# Patient Record
Sex: MALE | Race: WHITE | NOT HISPANIC OR LATINO | Employment: OTHER | URBAN - METROPOLITAN AREA
[De-identification: names, ages, dates, MRNs, and addresses within clinical notes are randomized per-mention and may not be internally consistent; named-entity substitution may affect disease eponyms.]

---

## 2023-08-14 ENCOUNTER — MEDICAL CORRESPONDENCE (OUTPATIENT)
Dept: HEALTH INFORMATION MANAGEMENT | Facility: CLINIC | Age: 63
End: 2023-08-14

## 2023-08-15 ENCOUNTER — TRANSCRIBE ORDERS (OUTPATIENT)
Dept: OTHER | Age: 63
End: 2023-08-15

## 2023-08-15 DIAGNOSIS — H53.9 VISUAL DISTURBANCE: ICD-10-CM

## 2023-08-15 DIAGNOSIS — H57.12 DISCOMFORT OF LEFT EYE: Primary | ICD-10-CM

## 2023-08-24 NOTE — TELEPHONE ENCOUNTER
FUTURE VISIT INFORMATION      FUTURE VISIT INFORMATION:  Date: 10/4/23  Time: 7:15am  Location: [facility]  REFERRAL INFORMATION:  Referring provider:  Dr Tamara Watkins   Referring providers clinic:  [facility]  Reason for visit/diagnosis  Discomfort of left eye [H57.12], Visual disturbance [H53.9],neuro-ophth-discomfort of left eye, visual disturbance    RECORDS REQUESTED FROM:       Clinic name Comments Records Status Imaging Status   [facility] OV/referral 8/14/23 Care Everywhere

## 2023-09-29 ENCOUNTER — TELEPHONE (OUTPATIENT)
Dept: OPHTHALMOLOGY | Facility: CLINIC | Age: 63
End: 2023-09-29
Payer: COMMERCIAL

## 2023-10-03 NOTE — PROGRESS NOTES
Jeremy Florez is a 63 year old male with the following diagnoses:   1. Central serous retinopathy, bilateral    2. Discomfort of left eye    3. Visual disturbance    4. Vitreous flashes of both eyes         Patient was sent for consultation by Dr. Watkins for visual disturbance left eye      HPI:    Patient has been having 3 complaints:     new flashes of lights OD for about a month that he hadn't noticed prior to his visit with Dr. Watkins on 8/14/23. He also has floaters but thee are chronic.  The flashes are temporal. They are there pretty much all day long.  They have not changed over the month.    Also having dark superior loss of peripheral vision loss each eye. This only lasts moments when he blinks and then is gone.    Patient was originally referred for pain in his left eye for about 3 months that is intermittent that isn't worsened with EOM. Feels like a dull ache behind the eye almost like a pressure sensation. Patient hasn't tried taking anything for his eye pain. Denies any vision change or double vision associated with the left eye pain. Patient has a rare headache that occurs about once monthly. Denies jaw claudication, new shoulder or girdle pain, weight loss, night sweats/fevers, eye redness. Denies any recent neuro imaging since symptoms started.       Independent historians:  Patient    Review of outside testing:  None obtained    My interpretation performed today of outside testing:  N/A     Review of outside clinical notes:   Assessment  1. Refractive error   2. VISUAL DISTURBANCE LEFT EYE WITH PRESSURE AND PAIN WHEN LAYING DOWN  Plan  1. DISPENSE MRX +2.50  2. SET UP NEURO OPHTHALMOLOGIST AT THE [facility] 1 year(s) or PRN   8/14/23 -- Visit with Dr. Watkins    Past medical history:  Ankylosing spondylitits   Crohns disease   Ulcerative colitis   Small fiber neuropathy       Medications:   Baclofen   Eliquis   Gabapentin   Hydrochlorothiazide   Taltz injections   Tylenol   Vitamin B12   Vitamin  D       Family history / social history:  Patient's Mom autoimmune disorders, father heart disease, sister cancer      Patient   None smoker, Alcohol 3 times weekly of about 2-3 beers.       Exam:  VA 20/25 OD, 20/25 OS. Pupils no rAPD. Color full each eye. Anterior segment wnl for age. No c/f. Posterior segment wnl for age, no h/t/d.       Tests ordered and interpreted today:  OCT        Discussion of management / interpretation with another provider:   None    Assessment/Plan:   It is my impression that patient complains of flashing lights in his right eye.  This symptom is most consistent with a retinal etiology.  His fundus exam shows 3 yellowish lesions in the right eye around the optic nerve.  OCT of both eyes demonstrates some shallow subretinal fluid in both eyes.  In the right eye there is an area of ellipsoid zone disruption.  To me, I believe that this would be most consistent with multiple areas of central serous retinopathy.  He states that he gets steroid shots in his shoulder or back about every 6 months or so.  He does not think he has had a steroid shot for quite some time.  In either case, I will have the patient see retina for evaluation for these areas of subretinal fluid.      He describes left eye pain.  The description of mild ache is most consistent with Dry eye syndrome. I asked the patient to use artificial tears as well as warm compresses to the eyelid margin  on a regular basis.  I asked the patient to take fish oil capsules 2x/day for a month and then 1x/d thereafter.   If that is not beneficial we could consider punctal plugs, corticosteroid eye drops and Restasis.      I am always happy to see Jeremy back for new concerns but I did not make a follow up appointment for him today.            Attending Physician Attestation:  Complete documentation of historical and exam elements from today's encounter can be found in the full encounter summary report (not reduplicated in this progress  note).  I personally obtained the chief complaint(s) and history of present illness.  I confirmed and edited as necessary the review of systems, past medical/surgical history, family history, social history, and examination findings as documented by others; and I examined the patient myself.  I personally reviewed the relevant tests, images, and reports as documented above.  I formulated and edited as necessary the assessment and plan and discussed the findings and management plan with the patient and family. I personally reviewed the ophthalmic test(s) associated with this encounter, agree with the interpretation(s) as documented by the resident/fellow, and have edited the corresponding report(s) as necessary.  - Lawrence Gupta MD   Fellow, Neuro-Ophthalmology

## 2023-10-04 ENCOUNTER — PRE VISIT (OUTPATIENT)
Dept: OPHTHALMOLOGY | Facility: CLINIC | Age: 63
End: 2023-10-04

## 2023-10-04 ENCOUNTER — OFFICE VISIT (OUTPATIENT)
Dept: OPHTHALMOLOGY | Facility: CLINIC | Age: 63
End: 2023-10-04
Payer: COMMERCIAL

## 2023-10-04 DIAGNOSIS — H53.9 VISUAL DISTURBANCE: ICD-10-CM

## 2023-10-04 DIAGNOSIS — H53.19 VITREOUS FLASHES OF BOTH EYES: ICD-10-CM

## 2023-10-04 DIAGNOSIS — H57.12 DISCOMFORT OF LEFT EYE: ICD-10-CM

## 2023-10-04 DIAGNOSIS — H35.713 CENTRAL SEROUS RETINOPATHY, BILATERAL: Primary | ICD-10-CM

## 2023-10-04 PROCEDURE — 99244 OFF/OP CNSLTJ NEW/EST MOD 40: CPT | Mod: GC | Performed by: OPHTHALMOLOGY

## 2023-10-04 PROCEDURE — 92134 CPTRZ OPH DX IMG PST SGM RTA: CPT | Mod: GC | Performed by: OPHTHALMOLOGY

## 2023-10-04 RX ORDER — IXEKIZUMAB 80 MG/ML
80 INJECTION, SOLUTION SUBCUTANEOUS
COMMUNITY
Start: 2023-08-14

## 2023-10-04 RX ORDER — GABAPENTIN 600 MG/1
TABLET ORAL
COMMUNITY
Start: 2023-09-20

## 2023-10-04 RX ORDER — HYDROCHLOROTHIAZIDE 25 MG/1
1 TABLET ORAL DAILY
COMMUNITY
Start: 2023-08-15

## 2023-10-04 RX ORDER — BACLOFEN 10 MG/1
20 TABLET ORAL
COMMUNITY
Start: 2023-09-07

## 2023-10-04 RX ORDER — METOPROLOL SUCCINATE 50 MG/1
1 TABLET, EXTENDED RELEASE ORAL DAILY
COMMUNITY
Start: 2023-09-20

## 2023-10-04 RX ORDER — ACETAMINOPHEN 500 MG
1000 TABLET ORAL
COMMUNITY

## 2023-10-04 RX ORDER — APIXABAN 5 MG/1
1 TABLET, FILM COATED ORAL 2 TIMES DAILY
COMMUNITY
Start: 2023-08-17

## 2023-10-04 RX ORDER — CHOLECALCIFEROL (VITAMIN D3) 50 MCG
2000 TABLET ORAL
COMMUNITY

## 2023-10-04 ASSESSMENT — CONF VISUAL FIELD
OS_SUPERIOR_TEMPORAL_RESTRICTION: 0
OS_INFERIOR_NASAL_RESTRICTION: 0
OD_INFERIOR_NASAL_RESTRICTION: 0
OD_INFERIOR_TEMPORAL_RESTRICTION: 0
OS_SUPERIOR_NASAL_RESTRICTION: 0
OD_SUPERIOR_NASAL_RESTRICTION: 0
METHOD: COUNTING FINGERS
OD_SUPERIOR_TEMPORAL_RESTRICTION: 0
OS_INFERIOR_TEMPORAL_RESTRICTION: 0
OD_NORMAL: 1
OS_NORMAL: 1

## 2023-10-04 ASSESSMENT — TONOMETRY
OS_IOP_MMHG: 12
OD_IOP_MMHG: 15
IOP_METHOD: ICARE

## 2023-10-04 ASSESSMENT — VISUAL ACUITY
OD_CC: 20/25
METHOD: SNELLEN - LINEAR
CORRECTION_TYPE: GLASSES
OS_CC+: +2
OS_CC: 20/25

## 2023-10-04 ASSESSMENT — EXTERNAL EXAM - LEFT EYE: OS_EXAM: NORMAL

## 2023-10-04 ASSESSMENT — REFRACTION_WEARINGRX
OD_ADD: +2.25
OS_AXIS: 156
OS_ADD: +2.25
OS_CYLINDER: +0.75
OD_CYLINDER: +1.00
OD_AXIS: 008
SPECS_TYPE: PAL
OD_SPHERE: -0.50
OS_SPHERE: -1.25

## 2023-10-04 ASSESSMENT — CUP TO DISC RATIO
OD_RATIO: 0.45
OS_RATIO: 0.5

## 2023-10-04 ASSESSMENT — SLIT LAMP EXAM - LIDS
COMMENTS: NORMAL
COMMENTS: NORMAL

## 2023-10-04 ASSESSMENT — EXTERNAL EXAM - RIGHT EYE: OD_EXAM: NORMAL

## 2023-10-04 NOTE — LETTER
10/4/2023         RE:  :  MRN: Jeremy Florez  1960  [id number]     Dear Dr. Watkins,    Thank you for asking me to see your very pleasant patient, Jeremy Florez, in neuro-ophthalmic consultation.  I would like to thank you for sending your records and I have summarized them in the history of present illness.  My assessment and plan are below.  For further details, please see my attached clinic note.      Jeremy Florez is a 63 year old male with the following diagnoses:   1. Discomfort of left eye    2. Visual disturbance         Patient was sent for consultation by Dr. Watkins for visual disturbance left eye      HPI:    Patient has been having 3 complaints:     new flashes of lights OD for about a month that he hadn't noticed prior to his visit with Dr. Watkins on 23. He also has floaters but thee are chronic.  The flashes are temporal. They are there pretty much all day long.  They have not changed over the month.    Also having dark superior loss of peripheral vision loss each eye. This only lasts moments when he blinks and then is gone.    Patient was originally referred for pain in his left eye for about 3 months that is intermittent that isn't worsened with EOM. Feels like a dull ache behind the eye almost like a pressure sensation. Patient hasn't tried taking anything for his eye pain. Denies any vision change or double vision associated with the left eye pain. Patient has a rare headache that occurs about once monthly. Denies jaw claudication, new shoulder or girdle pain, weight loss, night sweats/fevers, eye redness. Denies any recent neuro imaging since symptoms started.       Independent historians:  Patient    Review of outside testing:  None obtained    My interpretation performed today of outside testing:  N/A     Review of outside clinical notes:   Assessment  1. Refractive error   2. VISUAL DISTURBANCE LEFT EYE WITH PRESSURE AND PAIN WHEN LAYING DOWN  Plan  1. DISPENSE MRX +2.50  2. SET UP  NEURO OPHTHALMOLOGIST AT THE [facility] 1 year(s) or PRN   8/14/23 -- Visit with Dr. Watkins    Past medical history:  Ankylosing spondylitits   Crohns disease   Ulcerative colitis   Small fiber neuropathy       Medications:   Baclofen   Eliquis   Gabapentin   Hydrochlorothiazide   Taltz injections   Tylenol   Vitamin B12   Vitamin D       Family history / social history:  Patient's Mom autoimmune disorders, father heart disease, sister cancer      Patient   None smoker, Alcohol 3 times weekly of about 2-3 beers.       Exam:  VA 20/25 OD, 20/25 OS. Pupils no rAPD. Color full each eye. Anterior segment wnl for age. No c/f. Posterior segment wnl for age, no h/t/d.       Tests ordered and interpreted today:  OCT        Discussion of management / interpretation with another provider:   None    Assessment/Plan:   It is my impression that patient complains of flashing lights in his right eye.  This symptom is most consistent with a retinal etiology.  His fundus exam shows 3 yellowish lesions in the right eye around the optic nerve.  OCT of both eyes demonstrates some shallow subretinal fluid in both eyes.  In the right eye there is an area of ellipsoid zone disruption.  To me, I believe that this would be most consistent with multiple areas of central serous retinopathy.  He states that he gets steroid shots in his shoulder or back about every 6 months or so.  He does not think he has had a steroid shot for quite some time.  In either case, I will have the patient see retina for evaluation for these areas of subretinal fluid.      He describes left eye pain.  The description of mild ache is most consistent with Dry eye syndrome. I asked the patient to use artificial tears as well as warm compresses to the eyelid margin  on a regular basis.  I asked the patient to take fish oil capsules 2x/day for a month and then 1x/d thereafter.   If that is not beneficial we could consider punctal plugs, corticosteroid eye drops and  Restasis.      I am always happy to see Jeremy back for new concerns but I did not make a follow up appointment for him today.          Again, thank you for allowing me to participate in the care of your patient.      Sincerely,    Lawrence Verdugo MD  Professor  Ophthalmology Residency   Director of Neuro-Ophthalmology  [employer] [facility] [employer] Endowed Chair  Departments of Ophthalmology, Neurology, and Neurosurgery  [facility] [address] [place] [address]  [address]  T - [phone]  F - [phone]  [email or web address]      CC: Tamara Watkins, OD  [address] [facility] [address]  [address]  [place] [address]  Via Fax: [phone]    DX = central serous retinopathy

## 2023-10-04 NOTE — Clinical Note
10/4/2023       RE: Jeremy Florez  [address]  [place] [address]     Dear Colleague,    Thank you for referring your patient, Jeremy Florez, to the [facility]. Please see a copy of my visit note below.         Jeremy Florez is a 63 year old male with the following diagnoses:   No diagnosis found.     Patient was sent for consultation by Dr. Watkins for visual disturbance left eye      HPI:    ***      Independent historians:  Patient***    Review of outside testing:  None obtained    My interpretation performed today of outside testing:  I have independently reviewed *** performed ***. ***No abnormal FLAIR hyperintensity or enhancement in the orbits or elsewhere along the visual pathways.        Review of outside clinical notes:  No notes available   *** -- Visit with Dr. Watkins    Past medical history:    There is no problem list on file for this patient.        Medications:   This patient does not have an active medication from one of the medication groupers.      Family history / social history:  Patient's family history is not on file.     Patient         Exam:  Visual acuity 20/*** right eye 20/*** left eye.  Color vision ***/11 right eye and ***/11 left eye.  Pupils ***  Intraocular pressure *** right eye and *** left eye.  Anterior segment exam ***.  Fundus exam ***.  Strabismus exam  ***    Tests ordered and interpreted today:          Discussion of management / interpretation with another provider:   None***    Assessment/Plan:   It is my impression that patient has ***                           Precharting:  Nish Gupta MD   Fellow, Neuro-Ophthalmology              Again, thank you for allowing me to participate in the care of your patient.      Sincerely,    Lawrence Verdugo MD

## 2023-10-05 ENCOUNTER — TELEPHONE (OUTPATIENT)
Dept: OPHTHALMOLOGY | Facility: CLINIC | Age: 63
End: 2023-10-05
Payer: COMMERCIAL

## 2023-10-05 NOTE — TELEPHONE ENCOUNTER
Patient called regarding scheduling for a Retina Consultation-Per . Scheduled patient accordingly and sent reminder letter and map to confirmed email.-Per Patient

## 2023-10-27 DIAGNOSIS — H35.713 CENTRAL SEROUS RETINOPATHY, BILATERAL: Primary | ICD-10-CM

## 2023-11-08 ENCOUNTER — OFFICE VISIT (OUTPATIENT)
Dept: OPHTHALMOLOGY | Facility: CLINIC | Age: 63
End: 2023-11-08
Attending: OPHTHALMOLOGY
Payer: COMMERCIAL

## 2023-11-08 DIAGNOSIS — H40.003 GLAUCOMA SUSPECT OF BOTH EYES: ICD-10-CM

## 2023-11-08 DIAGNOSIS — H53.19 VITREOUS FLASHES OF BOTH EYES: ICD-10-CM

## 2023-11-08 DIAGNOSIS — H43.393 VITREOUS SYNERESIS OF BOTH EYES: ICD-10-CM

## 2023-11-08 DIAGNOSIS — H25.13 NUCLEAR SENILE CATARACT OF BOTH EYES: ICD-10-CM

## 2023-11-08 DIAGNOSIS — H35.713 CENTRAL SEROUS RETINOPATHY, BILATERAL: Primary | ICD-10-CM

## 2023-11-08 DIAGNOSIS — H53.9 VISUAL DISTURBANCE: ICD-10-CM

## 2023-11-08 PROCEDURE — 99207 FUNDUS PHOTOS OU (BOTH EYES): CPT | Mod: 26 | Performed by: OPHTHALMOLOGY

## 2023-11-08 PROCEDURE — 99213 OFFICE O/P EST LOW 20 MIN: CPT | Performed by: OPHTHALMOLOGY

## 2023-11-08 PROCEDURE — 92250 FUNDUS PHOTOGRAPHY W/I&R: CPT | Performed by: OPHTHALMOLOGY

## 2023-11-08 PROCEDURE — 99214 OFFICE O/P EST MOD 30 MIN: CPT | Mod: GC | Performed by: OPHTHALMOLOGY

## 2023-11-08 PROCEDURE — 92134 CPTRZ OPH DX IMG PST SGM RTA: CPT | Performed by: OPHTHALMOLOGY

## 2023-11-08 RX ORDER — LOSARTAN POTASSIUM 25 MG/1
25 TABLET ORAL DAILY
COMMUNITY

## 2023-11-08 ASSESSMENT — EXTERNAL EXAM - LEFT EYE: OS_EXAM: NORMAL

## 2023-11-08 ASSESSMENT — REFRACTION_WEARINGRX
OS_AXIS: 156
OS_SPHERE: -1.25
OD_ADD: +2.25
OS_CYLINDER: +0.75
OD_CYLINDER: +1.00
OD_SPHERE: -0.50
OD_AXIS: 008
OS_ADD: +2.25
SPECS_TYPE: PAL

## 2023-11-08 ASSESSMENT — VISUAL ACUITY
METHOD: SNELLEN - LINEAR
OS_CC: 20/30
OS_PH_CC: 20/25
OD_CC: 20/25

## 2023-11-08 ASSESSMENT — CONF VISUAL FIELD
OD_NORMAL: 1
OS_SUPERIOR_TEMPORAL_RESTRICTION: 0
OS_NORMAL: 1
METHOD: COUNTING FINGERS
OD_SUPERIOR_TEMPORAL_RESTRICTION: 0
OS_SUPERIOR_NASAL_RESTRICTION: 0
OS_INFERIOR_NASAL_RESTRICTION: 0
OS_INFERIOR_TEMPORAL_RESTRICTION: 0
OD_SUPERIOR_NASAL_RESTRICTION: 0
OD_INFERIOR_TEMPORAL_RESTRICTION: 0
OD_INFERIOR_NASAL_RESTRICTION: 0

## 2023-11-08 ASSESSMENT — TONOMETRY
IOP_METHOD: TONOPEN
OS_IOP_MMHG: 10
OD_IOP_MMHG: 14

## 2023-11-08 ASSESSMENT — EXTERNAL EXAM - RIGHT EYE: OD_EXAM: NORMAL

## 2023-11-08 ASSESSMENT — SLIT LAMP EXAM - LIDS
COMMENTS: NORMAL
COMMENTS: NORMAL

## 2023-11-08 ASSESSMENT — CUP TO DISC RATIO
OD_RATIO: 0.45
OS_RATIO: 0.5

## 2023-11-08 NOTE — NURSING NOTE
Chief Complaints and History of Present Illnesses   Patient presents with    Retinal Evaluation     Chief Complaint(s) and History of Present Illness(es)       Retinal Evaluation              Laterality: both eyes              Comments    Jeremy is here referred by Dr Verdugo for a retinal evaluation. He says he sees floaters  LE>RE and pain left eye that comes and goes. For the past three monts, he has seen flashes but unsure which eye.     Vadim Loco COT 1:02 PM November 8, 2023

## 2023-11-08 NOTE — PROGRESS NOTES
CC -   Floaters both eyes and spot in his vision left eye, pain left eye     INTERVAL HISTORY - Initial visit    HPI -   Jeremy Florez is a  63 year old year-old patient referred from neuro-ophthalmology for evaluation of CSCR each eye. Patient reports that around 3-6 months prior he started noticing pain, pressure feeling in left eye  > right eye, in addition around the same time he started noticing flashing lights affecting his vision, not sure which eye is worse, patient saw a general ophthalmologist who referred his to neuro-ophthalmology for evaluation on 10/4/2023. On 10/4/2023 his exam was consistent with CSCR each eye and was referred to the retina service for further evaluation.     PMHx:  - Ankylosing spondylitis 4 years ago (HLA B-27 +) currently on taltz (ixekizumab), follows with rheumatology. Has tried humira, infliximab, EMBERL, certolizumab with some relief but not as good as taltz.   - Ulcerative colitis diagnosed at the age of 27, treated with sulfasalazine, prednisone until the age of 37 when symptoms largely subsided.     - Had spine surgery for fusion in his neck and decompression of lumbar spine done in July 18/2023  - Has had steroid injections in the past for his lumbar spine around 6 per year for around 3 years. Most recent injection late step 2023    PAST OCULAR SURGERY  No surgical history    RETINAL IMAGING 11/8/2023:  OCT   OD - Normal foveal contour. Inferior parafoveal SRF with small area of PED. PHF detached  OS - Normal foveal contour. Inferotemporal subretinal fluid. PHF detached    Optos  Right eye: Hazy media, optic disc is flat without edema or atrophy. C/D ratio 0.45. Macula appears flat. Inferior subretinal fluid along arcade. No RD or RT in periphery.   Left eye: Hazy media, optic disc is flat without edema or atrophy. C/D ratio 0.6.  Macula appears flat. Subretinal fluid temporal to the macula and superonasal to the optic nerve. No RD or RT in periphery.     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  - No history of trauma. No FH  - IOP: 14/10    # NS cataract ou  - early and mild: observe    # Dry eye   - Lid scrubs  - Warm compresses  - Artifical tears    # Ankylosing spondylitis   - Follows with rheumatolog  - Currently on Taltz injections  - Discussed with patient that AS can present with anterior uveitis and to call back if he notices any pain or change in vision     return to clinic: 1-2 months, dilate, OCT each eye, sooner if symptoms worsen  Consider FA/ICGA in future    Complete documentation of historical and exam elements from today's encounter can be found in the full encounter summary report (not reduplicated in this progress note). I personally obtained the chief complaint(s) and history of present illness.  I confirmed and edited as necessary the review of systems, past medical/surgical history, family history, social history, and examination findings as documented by others; and I examined the patient myself. I personally reviewed the relevant tests, images, and reports as documented above. I formulated and edited as necessary the assessment and plan and discussed the findings and management plan with the patient and family.     Chu Galeano MD, PhD

## 2024-01-10 ENCOUNTER — OFFICE VISIT (OUTPATIENT)
Dept: OPHTHALMOLOGY | Facility: CLINIC | Age: 64
End: 2024-01-10
Attending: OPHTHALMOLOGY
Payer: COMMERCIAL

## 2024-01-10 DIAGNOSIS — H43.393 VITREOUS SYNERESIS OF BOTH EYES: ICD-10-CM

## 2024-01-10 DIAGNOSIS — H57.12 DISCOMFORT OF LEFT EYE: ICD-10-CM

## 2024-01-10 DIAGNOSIS — H25.13 NUCLEAR SENILE CATARACT OF BOTH EYES: ICD-10-CM

## 2024-01-10 DIAGNOSIS — H35.713 CENTRAL SEROUS RETINOPATHY, BILATERAL: Primary | ICD-10-CM

## 2024-01-10 DIAGNOSIS — H53.9 VISUAL DISTURBANCE: ICD-10-CM

## 2024-01-10 DIAGNOSIS — H40.003 GLAUCOMA SUSPECT OF BOTH EYES: ICD-10-CM

## 2024-01-10 PROCEDURE — 92134 CPTRZ OPH DX IMG PST SGM RTA: CPT | Performed by: OPHTHALMOLOGY

## 2024-01-10 PROCEDURE — 99214 OFFICE O/P EST MOD 30 MIN: CPT | Mod: GC | Performed by: OPHTHALMOLOGY

## 2024-01-10 PROCEDURE — 99213 OFFICE O/P EST LOW 20 MIN: CPT | Performed by: OPHTHALMOLOGY

## 2024-01-10 ASSESSMENT — TONOMETRY
OD_IOP_MMHG: 9
OS_IOP_MMHG: 9
IOP_METHOD: TONOPEN

## 2024-01-10 ASSESSMENT — CUP TO DISC RATIO
OS_RATIO: 0.6
OD_RATIO: 0.45

## 2024-01-10 ASSESSMENT — VISUAL ACUITY
OD_CC+: -2
OS_CC+: -2
CORRECTION_TYPE: GLASSES
METHOD: SNELLEN - LINEAR
OD_CC: 20/25
OS_CC: 20/25

## 2024-01-10 ASSESSMENT — SLIT LAMP EXAM - LIDS
COMMENTS: NORMAL
COMMENTS: NORMAL

## 2024-01-10 ASSESSMENT — EXTERNAL EXAM - LEFT EYE: OS_EXAM: NORMAL

## 2024-01-10 ASSESSMENT — EXTERNAL EXAM - RIGHT EYE: OD_EXAM: NORMAL

## 2024-01-10 ASSESSMENT — REFRACTION_WEARINGRX
OD_SPHERE: -0.50
OS_ADD: +2.25
OS_CYLINDER: +0.75
OD_AXIS: 008
SPECS_TYPE: PAL
OS_AXIS: 156
OS_SPHERE: -1.25
OD_ADD: +2.25
OD_CYLINDER: +1.00

## 2024-01-10 NOTE — PROGRESS NOTES
CC -   Floaters both eyes and spot in his vision left eye, pain left eye     INTERVAL HISTORY - At 6 months since spinal surgery, recovering well. Not currently on any oral steroids but gets occasional joint injections. Last injection summer 2023. Has not had any oral steroids during spinal surgery course. Vision overall stable.    HPI -   Jeremy Florez is a  63 year old year-old patient referred from neuro-ophthalmology for evaluation of CSCR each eye. Patient reports that around 3-6 months prior he started noticing pain, pressure feeling in left eye  > right eye, in addition around the same time he started noticing flashing lights affecting his vision, not sure which eye is worse, patient saw a general ophthalmologist who referred his to neuro-ophthalmology for evaluation on 10/4/2023. On 10/4/2023 his exam was consistent with CSCR each eye and was referred to the retina service for further evaluation.     PMHx:  - Ankylosing spondylitis 4 years ago (HLA B-27 +) currently on taltz (ixekizumab), follows with rheumatology. Has tried humira, infliximab, EMBERL, certolizumab with some relief but not as good as taltz.   - Ulcerative colitis diagnosed at the age of 27, treated with sulfasalazine, prednisone until the age of 37 when symptoms largely subsided.     - Had spine surgery for fusion in his neck and decompression of lumbar spine done in July 18/2023  - Has had steroid injections in the past for his lumbar spine around 6 per year for around 3 years. Most recent injection late step 2023    PAST OCULAR SURGERY  No surgical history    RETINAL IMAGING 11/8/2023:  OCT 1/10/24  OD - Normal foveal contour. Trace residual inferior parafoveal SRF with small area of PED, mostly resolved. PHF detached  OS - Normal foveal contour. Stable inferotemporal subretinal fluid. PHF detached    Optos  Right eye: Hazy media, optic disc is flat without edema or atrophy. C/D ratio 0.45. Macula appears flat. Inferior subretinal fluid  along arcade. No RD or RT in periphery.   Left eye: Hazy media, optic disc is flat without edema or atrophy. C/D ratio 0.6.  Macula appears flat. Subretinal fluid temporal to the macula and superonasal to the optic nerve. No RD or RT in periphery.     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  - No history of trauma. No FH  - IOP: 9/9    # NS cataract ou  - early and mild: observe    # Dry eye   - Lid scrubs  - Warm compresses  - Artifical tears    # Ankylosing spondylitis   - Follows with rheumatolog  - Currently on Taltz injections  - Discussed with patient that AS can present with anterior uveitis and to call back if he notices any pain or change in vision     return to clinic: 6 months, VTD, OCT each eye, sooner if symptoms worsen  Consider FA/ICGA in future    Liv Cortez MD  Resident Physician, PGY-3  Department of Ophthalmology      Complete documentation of historical and exam elements from today's encounter can be found in the full encounter summary report (not reduplicated in this progress note). I personally obtained the chief complaint(s) and history of present illness.  I confirmed and edited as necessary the review of systems, past medical/surgical history, family history, social  history, and examination findings as documented by others; and I examined the patient myself. I personally reviewed the relevant tests, images, and reports as documented above. I formulated and edited as necessary the assessment and plan and discussed the findings and management plan with the patient and family.     Chu Galeano MD, PhD

## 2024-01-10 NOTE — NURSING NOTE
Chief Complaints and History of Present Illnesses   Patient presents with    Central Serous Retinopathy Follow Up     Central serous retinopathy, bilateral      Chief Complaint(s) and History of Present Illness(es)       Central Serous Retinopathy Follow Up              Laterality: both eyes    Associated symptoms: dryness, eye pain, flashes, floaters and foreign body sensation    Treatments tried: artificial tears    Pain scale: 2/10    Comments: Central serous retinopathy, bilateral               Comments    Pt state vision is the same.  Some pressure pain LE.  Kaleidescope lights RE>LE in peripheral VA.  No change to floaters LE>RE.  Some scratchy FBS BE.    AT's PRN.    GAIL Ackerman January 10, 2024 8:46 AM

## 2024-07-10 DIAGNOSIS — H35.713 CENTRAL SEROUS RETINOPATHY, BILATERAL: Primary | ICD-10-CM

## 2024-11-17 ENCOUNTER — HEALTH MAINTENANCE LETTER (OUTPATIENT)
Age: 64
End: 2024-11-17